# Patient Record
Sex: FEMALE | Race: WHITE | NOT HISPANIC OR LATINO | URBAN - METROPOLITAN AREA
[De-identification: names, ages, dates, MRNs, and addresses within clinical notes are randomized per-mention and may not be internally consistent; named-entity substitution may affect disease eponyms.]

---

## 2021-01-27 ENCOUNTER — WEB ENCOUNTER (OUTPATIENT)
Dept: URBAN - METROPOLITAN AREA CLINIC 113 | Facility: CLINIC | Age: 81
End: 2021-01-27

## 2021-01-27 ENCOUNTER — OFFICE VISIT (OUTPATIENT)
Dept: URBAN - METROPOLITAN AREA CLINIC 113 | Facility: CLINIC | Age: 81
End: 2021-01-27
Payer: MEDICARE

## 2021-01-27 VITALS
SYSTOLIC BLOOD PRESSURE: 155 MMHG | BODY MASS INDEX: 17.48 KG/M2 | HEIGHT: 62 IN | TEMPERATURE: 97.1 F | WEIGHT: 95 LBS | HEART RATE: 80 BPM | DIASTOLIC BLOOD PRESSURE: 59 MMHG

## 2021-01-27 DIAGNOSIS — R63.4 WEIGHT LOSS: ICD-10-CM

## 2021-01-27 DIAGNOSIS — K52.831 COLLAGENOUS COLITIS: ICD-10-CM

## 2021-01-27 DIAGNOSIS — K52.9 CHRONIC DIARRHEA: ICD-10-CM

## 2021-01-27 PROCEDURE — G8482 FLU IMMUNIZE ORDER/ADMIN: HCPCS | Performed by: INTERNAL MEDICINE

## 2021-01-27 PROCEDURE — G8427 DOCREV CUR MEDS BY ELIG CLIN: HCPCS | Performed by: INTERNAL MEDICINE

## 2021-01-27 PROCEDURE — 4004F PT TOBACCO SCREEN RCVD TLK: CPT | Performed by: INTERNAL MEDICINE

## 2021-01-27 PROCEDURE — 99214 OFFICE O/P EST MOD 30 MIN: CPT | Performed by: INTERNAL MEDICINE

## 2021-01-27 RX ORDER — HYOSCYAMINE SULFATE 0.12 MG/1
TAKE 1 TABLET (0.125 MG) BY ORAL ROUTE 4 TIMES PER DAY TABLET ORAL
Qty: 0 | Refills: 0 | Status: ON HOLD | COMMUNITY
Start: 1900-01-01

## 2021-01-27 RX ORDER — DONEPEZIL HYDROCHLORIDE 10 MG/1
1 TABLET AT BEDTIME TABLET, FILM COATED ORAL ONCE A DAY
Status: ACTIVE | COMMUNITY

## 2021-01-27 RX ORDER — B-COMPLEX WITH VITAMIN C
AS DIRECTED TABLET ORAL
Status: ACTIVE | COMMUNITY

## 2021-01-27 RX ORDER — TIOTROPIUM BROMIDE 18 UG/1
CAPSULE ORAL; RESPIRATORY (INHALATION)
Qty: 0 | Refills: 0 | Status: ACTIVE | COMMUNITY
Start: 1900-01-01

## 2021-01-27 RX ORDER — ASPIRIN 81 MG/1
1 TABLET TABLET ORAL ONCE A DAY
Status: ACTIVE | COMMUNITY

## 2021-01-27 RX ORDER — BUDESONIDE 3 MG/1
AS DIRECTED CAPSULE, COATED PELLETS ORAL THREE TIMES A DAY
Status: ACTIVE | COMMUNITY

## 2021-01-27 RX ORDER — MEMANTINE HYDROCHLORIDE 10 MG/1
TAKE 1 TABLET (10 MG) BY ORAL ROUTE 2 TIMES PER DAY TABLET ORAL 2
Qty: 0 | Refills: 0 | Status: ACTIVE | COMMUNITY
Start: 1900-01-01

## 2021-01-27 RX ORDER — GABAPENTIN 300 MG/1
TAKE 2 CAPSULE (300 MG) BY ORAL ROUTE DAILY CAPSULE ORAL
Refills: 0 | Status: ACTIVE | COMMUNITY
Start: 1900-01-01

## 2021-01-27 RX ORDER — LORAZEPAM 0.5 MG/1
TAKE 1 TABLET (0.5 MG) BY ORAL ROUTE AS NEEDED TABLET ORAL 2
Qty: 0 | Refills: 0 | Status: ON HOLD | COMMUNITY
Start: 1900-01-01

## 2021-01-27 RX ORDER — ESCITALOPRAM OXALATE 20 MG/1
1 TABLET TABLET, FILM COATED ORAL ONCE A DAY
Status: ACTIVE | COMMUNITY

## 2021-01-27 RX ORDER — TRAZODONE HYDROCHLORIDE 100 MG/1
1 TABLET AT BEDTIME TABLET, FILM COATED ORAL ONCE A DAY
Status: ACTIVE | COMMUNITY

## 2021-01-27 NOTE — HPI-TODAY'S VISIT:
Patient presents today for initial evaluation.  She has a history of collagenous colitis diagnosed by her doctors in [place] in the past.  Her she been doing well until the last week or so when she developed increasing frequency of stool.  She reports 8 or more bowel movements per day.  Watery.  No blood.  Little abdominal cramping.  Questionable nighttime defecation.  No obvious precipitant although she had been on amoxicillin recently for some dental work.  No associated chest pain.  She has chronic lung issues.  She was seen by her primary care physician who did lab work.  These are reviewed.  She had a normal CBC, CRP, sed rate, CMP, TSH.  Her last colonoscopy was reviewed from 2016 which showed a small polyp.  She did have collagenous colitis based on those office notes which are reviewed.  Patient had stool studies although those are not available at this time.

## 2021-02-23 ENCOUNTER — TELEPHONE ENCOUNTER (OUTPATIENT)
Dept: URBAN - METROPOLITAN AREA CLINIC 113 | Facility: CLINIC | Age: 81
End: 2021-02-23

## 2021-02-25 ENCOUNTER — OFFICE VISIT (OUTPATIENT)
Dept: URBAN - METROPOLITAN AREA CLINIC 113 | Facility: CLINIC | Age: 81
End: 2021-02-25
Payer: MEDICARE

## 2021-02-25 VITALS
BODY MASS INDEX: 17.66 KG/M2 | DIASTOLIC BLOOD PRESSURE: 68 MMHG | HEART RATE: 78 BPM | WEIGHT: 96 LBS | HEIGHT: 62 IN | SYSTOLIC BLOOD PRESSURE: 148 MMHG | TEMPERATURE: 97.8 F

## 2021-02-25 DIAGNOSIS — K52.9 CHRONIC DIARRHEA: ICD-10-CM

## 2021-02-25 DIAGNOSIS — K52.831 COLLAGENOUS COLITIS: ICD-10-CM

## 2021-02-25 PROCEDURE — 99213 OFFICE O/P EST LOW 20 MIN: CPT | Performed by: NURSE PRACTITIONER

## 2021-02-25 RX ORDER — TIOTROPIUM BROMIDE 18 UG/1
CAPSULE ORAL; RESPIRATORY (INHALATION)
Qty: 0 | Refills: 0 | Status: ACTIVE | COMMUNITY
Start: 1900-01-01

## 2021-02-25 RX ORDER — BUDESONIDE 3 MG/1
AS DIRECTED CAPSULE, COATED PELLETS ORAL THREE TIMES A DAY
Status: ACTIVE | COMMUNITY

## 2021-02-25 RX ORDER — BUDESONIDE 3 MG/1
AS DIRECTED CAPSULE, COATED PELLETS ORAL THREE TIMES A DAY
OUTPATIENT

## 2021-02-25 RX ORDER — B-COMPLEX WITH VITAMIN C
AS DIRECTED TABLET ORAL
Status: ACTIVE | COMMUNITY

## 2021-02-25 RX ORDER — LORAZEPAM 0.5 MG/1
TAKE 1 TABLET (0.5 MG) BY ORAL ROUTE AS NEEDED TABLET ORAL 2
Qty: 0 | Refills: 0 | Status: ON HOLD | COMMUNITY
Start: 1900-01-01

## 2021-02-25 RX ORDER — DONEPEZIL HYDROCHLORIDE 10 MG/1
1 TABLET AT BEDTIME TABLET, FILM COATED ORAL ONCE A DAY
Status: ACTIVE | COMMUNITY

## 2021-02-25 RX ORDER — ESCITALOPRAM OXALATE 20 MG/1
1 TABLET TABLET, FILM COATED ORAL ONCE A DAY
Status: ACTIVE | COMMUNITY

## 2021-02-25 RX ORDER — GABAPENTIN 300 MG/1
3 CAPSULE CAPSULE ORAL ONCE A DAY
Refills: 0 | Status: ACTIVE | COMMUNITY
Start: 1900-01-01

## 2021-02-25 RX ORDER — MEMANTINE HYDROCHLORIDE 10 MG/1
TAKE 1 TABLET (10 MG) BY ORAL ROUTE 2 TIMES PER DAY TABLET ORAL 2
Qty: 0 | Refills: 0 | Status: ACTIVE | COMMUNITY
Start: 1900-01-01

## 2021-02-25 RX ORDER — ASPIRIN 81 MG/1
1 TABLET TABLET ORAL ONCE A DAY
Status: ACTIVE | COMMUNITY

## 2021-02-25 RX ORDER — TRAZODONE HYDROCHLORIDE 100 MG/1
1 TABLET AT BEDTIME TABLET, FILM COATED ORAL ONCE A DAY
Status: ACTIVE | COMMUNITY

## 2021-02-25 RX ORDER — HYOSCYAMINE SULFATE 0.12 MG/1
TAKE 1 TABLET (0.125 MG) BY ORAL ROUTE 4 TIMES PER DAY TABLET ORAL
Qty: 0 | Refills: 0 | Status: ON HOLD | COMMUNITY
Start: 1900-01-01

## 2021-02-25 NOTE — HPI-OTHER HISTORIES
80-year-old woman presenting for follow-up regarding diarrhea.  She was last seen in the office on 1/27/21.  She reported a history of collagenous colitis diagnosed by her doctors in [place] in the past.  At that time, she had been doing well until the last week or so when she developed increasing frequency of stool characterized by 8 or more bowel movements per day.  Stools were watery but nonbloody.  There was questionable nighttime defecation.  This was felt to be a flare of her collagenous colitis.  She noted improvement in stool frequency with budesonide 9 mg daily.  She was instructed to begin tapering was.  She was encouraged she is Imodium 3 times daily as needed. She called the office on 2/23/21 reporting recurrent increasing frequency following budesonide taper.  She completely tapered off of the budesonide, and experienced recurrence of diarrhea. She spends more than half the day in the restroom with diarrhea. She has had at least 4 watery stools this morning; it is 11:41 am. She typically has 5 or more stools per day. She has had incontinence a couple of times. She has not been supplementing with Ensure in attempt to avoid dairy products. No blood per rectum. No fever or chills. She resumed budesonide 3mg, 3 capsules daily approximately 2 days ago.

## 2021-03-01 ENCOUNTER — TELEPHONE ENCOUNTER (OUTPATIENT)
Dept: URBAN - METROPOLITAN AREA CLINIC 113 | Facility: CLINIC | Age: 81
End: 2021-03-01

## 2021-03-01 RX ORDER — DICYCLOMINE HYDROCHLORIDE 10 MG/1
1 TABLET CAPSULE ORAL ONCE A DAY
Qty: 30 | Refills: 0 | OUTPATIENT
Start: 2021-03-01 | End: 2021-03-31

## 2021-03-03 ENCOUNTER — TELEPHONE ENCOUNTER (OUTPATIENT)
Dept: URBAN - METROPOLITAN AREA CLINIC 113 | Facility: CLINIC | Age: 81
End: 2021-03-03

## 2021-03-03 RX ORDER — DICYCLOMINE HYDROCHLORIDE 10 MG/1
1 TABLET CAPSULE ORAL ONCE A DAY
Qty: 30 | Refills: 0
Start: 2021-03-01

## 2021-03-05 ENCOUNTER — TELEPHONE ENCOUNTER (OUTPATIENT)
Dept: URBAN - METROPOLITAN AREA CLINIC 113 | Facility: CLINIC | Age: 81
End: 2021-03-05

## 2021-03-05 RX ORDER — DIPHENOXYLATE HYDROCHLORIDE AND ATROPINE SULFATE 2.5; .025 MG/1; MG/1
1 TABLET AS NEEDED TABLET ORAL
Qty: 120 TABLET | Refills: 0 | OUTPATIENT
Start: 2021-03-05 | End: 2021-04-04

## 2021-03-23 ENCOUNTER — OFFICE VISIT (OUTPATIENT)
Dept: URBAN - METROPOLITAN AREA CLINIC 107 | Facility: CLINIC | Age: 81
End: 2021-03-23
Payer: MEDICARE

## 2021-03-23 ENCOUNTER — WEB ENCOUNTER (OUTPATIENT)
Dept: URBAN - METROPOLITAN AREA CLINIC 107 | Facility: CLINIC | Age: 81
End: 2021-03-23

## 2021-03-23 VITALS
DIASTOLIC BLOOD PRESSURE: 59 MMHG | HEIGHT: 62 IN | BODY MASS INDEX: 17.08 KG/M2 | TEMPERATURE: 98.2 F | WEIGHT: 92.8 LBS | SYSTOLIC BLOOD PRESSURE: 117 MMHG | HEART RATE: 70 BPM

## 2021-03-23 DIAGNOSIS — K52.831 COLLAGENOUS COLITIS: ICD-10-CM

## 2021-03-23 DIAGNOSIS — K52.9 CHRONIC DIARRHEA: ICD-10-CM

## 2021-03-23 DIAGNOSIS — R93.5 ABNORMAL ABDOMINAL CT SCAN: ICD-10-CM

## 2021-03-23 DIAGNOSIS — R63.4 WEIGHT LOSS: ICD-10-CM

## 2021-03-23 DIAGNOSIS — R10.9 STOMACH PAIN: ICD-10-CM

## 2021-03-23 DIAGNOSIS — E86.0 DEHYDRATION: ICD-10-CM

## 2021-03-23 PROCEDURE — 99213 OFFICE O/P EST LOW 20 MIN: CPT | Performed by: NURSE PRACTITIONER

## 2021-03-23 RX ORDER — DIPHENOXYLATE HYDROCHLORIDE AND ATROPINE SULFATE 2.5; .025 MG/1; MG/1
1 TABLET AS NEEDED TABLET ORAL
Qty: 120 TABLET | Refills: 0 | Status: ACTIVE | COMMUNITY
Start: 2021-03-05 | End: 2021-04-04

## 2021-03-23 RX ORDER — ASPIRIN 81 MG/1
1 TABLET TABLET ORAL ONCE A DAY
Status: ON HOLD | COMMUNITY

## 2021-03-23 RX ORDER — TIOTROPIUM BROMIDE 18 UG/1
CAPSULE ORAL; RESPIRATORY (INHALATION)
Qty: 0 | Refills: 0 | Status: ACTIVE | COMMUNITY
Start: 1900-01-01

## 2021-03-23 RX ORDER — DONEPEZIL HYDROCHLORIDE 10 MG/1
1 TABLET AT BEDTIME TABLET, FILM COATED ORAL ONCE A DAY
Status: ACTIVE | COMMUNITY

## 2021-03-23 RX ORDER — TRAZODONE HYDROCHLORIDE 100 MG/1
1 TABLET AT BEDTIME TABLET, FILM COATED ORAL ONCE A DAY
Status: ACTIVE | COMMUNITY

## 2021-03-23 RX ORDER — HYOSCYAMINE SULFATE 0.12 MG/1
TAKE 1 TABLET (0.125 MG) BY ORAL ROUTE 4 TIMES PER DAY TABLET ORAL
Qty: 0 | Refills: 0 | Status: ON HOLD | COMMUNITY
Start: 1900-01-01

## 2021-03-23 RX ORDER — LORAZEPAM 0.5 MG/1
TAKE 1 TABLET (0.5 MG) BY ORAL ROUTE AS NEEDED TABLET ORAL 2
Qty: 0 | Refills: 0 | Status: ON HOLD | COMMUNITY
Start: 1900-01-01

## 2021-03-23 RX ORDER — ESCITALOPRAM OXALATE 20 MG/1
1 TABLET TABLET, FILM COATED ORAL ONCE A DAY
Status: ACTIVE | COMMUNITY

## 2021-03-23 RX ORDER — B-COMPLEX WITH VITAMIN C
AS DIRECTED TABLET ORAL
Status: ACTIVE | COMMUNITY

## 2021-03-23 RX ORDER — DENOSUMAB 60 MG/ML
AS DIRECTED INJECTION SUBCUTANEOUS
Status: ACTIVE | COMMUNITY

## 2021-03-23 RX ORDER — MEMANTINE HYDROCHLORIDE 10 MG/1
TAKE 1 TABLET (10 MG) BY ORAL ROUTE 2 TIMES PER DAY TABLET ORAL 2
Qty: 0 | Refills: 0 | Status: ACTIVE | COMMUNITY
Start: 1900-01-01

## 2021-03-23 RX ORDER — BUDESONIDE 3 MG/1
AS DIRECTED CAPSULE, COATED PELLETS ORAL THREE TIMES A DAY
OUTPATIENT

## 2021-03-23 RX ORDER — DIPHENOXYLATE HYDROCHLORIDE AND ATROPINE SULFATE 2.5; .025 MG/1; MG/1
1 TABLET AS NEEDED TABLET ORAL
Status: ACTIVE | COMMUNITY

## 2021-03-23 RX ORDER — BUDESONIDE 3 MG/1
AS DIRECTED CAPSULE, COATED PELLETS ORAL THREE TIMES A DAY
Status: ACTIVE | COMMUNITY

## 2021-03-23 RX ORDER — DICYCLOMINE HYDROCHLORIDE 10 MG/1
1 TABLET CAPSULE ORAL ONCE A DAY
Qty: 30 | Refills: 0 | Status: ACTIVE | COMMUNITY
Start: 2021-03-01

## 2021-03-23 RX ORDER — DIPHENOXYLATE HYDROCHLORIDE AND ATROPINE SULFATE 2.5; .025 MG/1; MG/1
1 TABLET AS NEEDED TABLET ORAL
OUTPATIENT

## 2021-03-23 RX ORDER — GABAPENTIN 300 MG/1
3 CAPSULE CAPSULE ORAL ONCE A DAY
Refills: 0 | Status: ACTIVE | COMMUNITY
Start: 1900-01-01

## 2021-03-23 NOTE — HPI-TODAY'S VISIT:
80-year-old woman presenting for follow-up regarding chronic diarrhea, likely collagenous colitis associated with abdominal cramping.  She was last seen in the office on 2/25/21 with complaints of chronic diarrhea likely collagenous colitis, initially improved with a short course (less than 1 week) of budesonide 9 mg daily, recurring following taper.  She was recommended to resume budesonide 9 mg  daily for 4 weeks, tapering to 6 mg for 1 week then 3 mg for one week.  She was recommended to continue  dairy avoidance, as well as a diet low in fermentable sugars.  She reports persistent diarrhea characterized by 4 stools in the morning associated with significant lower abdominal cramping. It was very confusing as to what medication regimen she has been following for the last 4 weeks as she presented today with her son-in-law, and last visit with her daughter. Initially, Mark (son-in-law) stated that she was taking the medication, thought to be budesonide, 4 times daily which was then decreased to once daily because of constipation. It turned out that she was taking Imodium or Lomotil four times daily with resulting constipation. It appears that she has been on budesonide 3 times daily (9mg) for the last 4 weeks. She is currently taking dicyclomine as needed, and taking Lomotil up to 4 times per day. Her weight is stable. However, Mark raises concern about her dietary intake, stating she will often skip meals. Her diet does seem to continue to include cheese products and broccoli.

## 2021-04-05 ENCOUNTER — TELEPHONE ENCOUNTER (OUTPATIENT)
Dept: URBAN - METROPOLITAN AREA CLINIC 113 | Facility: CLINIC | Age: 81
End: 2021-04-05

## 2021-04-05 RX ORDER — BUDESONIDE 3 MG/1
AS DIRECTED CAPSULE, COATED PELLETS ORAL THREE TIMES A DAY
Qty: 90 | Refills: 1

## 2021-04-08 ENCOUNTER — OFFICE VISIT (OUTPATIENT)
Dept: URBAN - METROPOLITAN AREA CLINIC 113 | Facility: CLINIC | Age: 81
End: 2021-04-08

## 2021-04-13 ENCOUNTER — TELEPHONE ENCOUNTER (OUTPATIENT)
Dept: URBAN - METROPOLITAN AREA CLINIC 113 | Facility: CLINIC | Age: 81
End: 2021-04-13

## 2021-04-13 RX ORDER — DIPHENOXYLATE HYDROCHLORIDE AND ATROPINE SULFATE 2.5; .025 MG/1; MG/1: 1 TABLET AS NEEDED TABLET ORAL

## 2021-04-22 ENCOUNTER — OFFICE VISIT (OUTPATIENT)
Dept: URBAN - METROPOLITAN AREA CLINIC 113 | Facility: CLINIC | Age: 81
End: 2021-04-22
Payer: MEDICARE

## 2021-04-22 ENCOUNTER — LAB OUTSIDE AN ENCOUNTER (OUTPATIENT)
Dept: URBAN - METROPOLITAN AREA CLINIC 113 | Facility: CLINIC | Age: 81
End: 2021-04-22

## 2021-04-22 DIAGNOSIS — R63.4 WEIGHT LOSS: ICD-10-CM

## 2021-04-22 DIAGNOSIS — R19.7 DIARRHEA, UNSPECIFIED TYPE: ICD-10-CM

## 2021-04-22 DIAGNOSIS — K52.831 COLLAGENOUS COLITIS: ICD-10-CM

## 2021-04-22 PROBLEM — 62315008: Status: ACTIVE | Noted: 2021-04-22

## 2021-04-22 PROCEDURE — 99214 OFFICE O/P EST MOD 30 MIN: CPT | Performed by: INTERNAL MEDICINE

## 2021-04-22 RX ORDER — DONEPEZIL HYDROCHLORIDE 10 MG/1
1 TABLET AT BEDTIME TABLET, FILM COATED ORAL ONCE A DAY
Status: ACTIVE | COMMUNITY

## 2021-04-22 RX ORDER — B-COMPLEX WITH VITAMIN C
AS DIRECTED TABLET ORAL
Status: ACTIVE | COMMUNITY

## 2021-04-22 RX ORDER — DUREZOL 0.5 MG/ML
1 DROP INTO AFFECTED EYE EMULSION OPHTHALMIC TWICE A DAY
Status: ACTIVE | COMMUNITY

## 2021-04-22 RX ORDER — DENOSUMAB 60 MG/ML
AS DIRECTED INJECTION SUBCUTANEOUS
Status: ACTIVE | COMMUNITY

## 2021-04-22 RX ORDER — CHOLESTYRAMINE 4 G/9G
1 PACKET MIXED WITH WATER OR NON-CARBONATED DRINK POWDER, FOR SUSPENSION ORAL
Qty: 60 | Refills: 5 | OUTPATIENT
Start: 2021-04-22

## 2021-04-22 RX ORDER — DIPHENOXYLATE HYDROCHLORIDE AND ATROPINE SULFATE 2.5; .025 MG/1; MG/1
1 TABLET AS NEEDED TABLET ORAL
Status: ACTIVE | COMMUNITY

## 2021-04-22 RX ORDER — BUDESONIDE 3 MG/1
AS DIRECTED CAPSULE, COATED PELLETS ORAL THREE TIMES A DAY
Qty: 90 | Refills: 1 | Status: ON HOLD | COMMUNITY

## 2021-04-22 RX ORDER — GABAPENTIN 300 MG/1
3 CAPSULE CAPSULE ORAL ONCE A DAY
Refills: 0 | Status: ACTIVE | COMMUNITY
Start: 1900-01-01

## 2021-04-22 RX ORDER — MIRTAZAPINE 15 MG/1
1 TABLET AT BEDTIME TABLET, FILM COATED ORAL ONCE A DAY
Status: ACTIVE | COMMUNITY

## 2021-04-22 RX ORDER — ASPIRIN 81 MG/1
1 TABLET TABLET ORAL ONCE A DAY
Status: DISCONTINUED | COMMUNITY

## 2021-04-22 RX ORDER — TRAZODONE HYDROCHLORIDE 100 MG/1
1 TABLET AT BEDTIME TABLET, FILM COATED ORAL ONCE A DAY
Status: DISCONTINUED | COMMUNITY

## 2021-04-22 RX ORDER — LORAZEPAM 0.5 MG/1
TAKE 1 TABLET (0.5 MG) BY ORAL ROUTE AS NEEDED TABLET ORAL 2
Qty: 0 | Refills: 0 | Status: DISCONTINUED | COMMUNITY
Start: 1900-01-01

## 2021-04-22 RX ORDER — TIOTROPIUM BROMIDE 18 UG/1
CAPSULE ORAL; RESPIRATORY (INHALATION)
Qty: 0 | Refills: 0 | Status: ACTIVE | COMMUNITY
Start: 1900-01-01

## 2021-04-22 RX ORDER — DICYCLOMINE HYDROCHLORIDE 10 MG/1
1 TABLET CAPSULE ORAL ONCE A DAY
Qty: 30 | Refills: 0 | Status: ACTIVE | COMMUNITY
Start: 2021-03-01

## 2021-04-22 RX ORDER — MEMANTINE HYDROCHLORIDE 10 MG/1
TAKE 1 TABLET (10 MG) BY ORAL ROUTE 2 TIMES PER DAY TABLET ORAL 2
Qty: 0 | Refills: 0 | Status: ACTIVE | COMMUNITY
Start: 1900-01-01

## 2021-04-22 RX ORDER — PREDNISOLN SP/MOXIFLOX/BROMFEN 1 %-0.5 %
AS DIRECTED DROPS OPHTHALMIC (EYE)
Status: ACTIVE | COMMUNITY

## 2021-04-22 RX ORDER — ESCITALOPRAM OXALATE 20 MG/1
1 TABLET TABLET, FILM COATED ORAL ONCE A DAY
Status: ACTIVE | COMMUNITY

## 2021-04-22 RX ORDER — HYOSCYAMINE SULFATE 0.12 MG/1
TAKE 1 TABLET (0.125 MG) BY ORAL ROUTE 4 TIMES PER DAY TABLET ORAL
Qty: 0 | Refills: 0 | Status: DISCONTINUED | COMMUNITY
Start: 1900-01-01

## 2021-04-22 NOTE — HPI-TODAY'S VISIT:
Patient returns today in follow-up accompanied by her daughter.  She is having 2 bowel movements per day which are loose.  It continues to sound like there is a lot of confusion regarding what her bowel pattern truly is.  She has some times where she is not going as frequently.  Loose stool continues to be the primary complaint.  She is off Entocort.  She is taking Lomotil up to 4 times a day.  She was taking fiber previously.  She is not having blood in the stool.  There is no abdominal pain.  Daughter remains concerned about weight loss.  Previously there was some discussion regarding her not eating meals with some component of dementia involved.  They tell me that she is eating meals although the daughter is not present for each meal.  Patient specifically denies any swallowing difficulty or pain with eating.  She denies anorexia

## 2021-05-14 ENCOUNTER — WEB ENCOUNTER (OUTPATIENT)
Dept: URBAN - METROPOLITAN AREA CLINIC 113 | Facility: CLINIC | Age: 81
End: 2021-05-14

## 2021-05-28 ENCOUNTER — OFFICE VISIT (OUTPATIENT)
Dept: URBAN - METROPOLITAN AREA CLINIC 113 | Facility: CLINIC | Age: 81
End: 2021-05-28
Payer: MEDICARE

## 2021-05-28 VITALS
DIASTOLIC BLOOD PRESSURE: 62 MMHG | HEIGHT: 62 IN | TEMPERATURE: 97.6 F | SYSTOLIC BLOOD PRESSURE: 124 MMHG | WEIGHT: 95 LBS | HEART RATE: 76 BPM | BODY MASS INDEX: 17.48 KG/M2 | RESPIRATION RATE: 18 BRPM

## 2021-05-28 DIAGNOSIS — R63.4 WEIGHT LOSS: ICD-10-CM

## 2021-05-28 DIAGNOSIS — K52.831 COLLAGENOUS COLITIS: ICD-10-CM

## 2021-05-28 DIAGNOSIS — K92.1 BLACK STOOL: ICD-10-CM

## 2021-05-28 PROBLEM — 19311003 COLLAGENOUS COLITIS: Status: ACTIVE | Noted: 2021-01-27

## 2021-05-28 PROCEDURE — 99214 OFFICE O/P EST MOD 30 MIN: CPT | Performed by: INTERNAL MEDICINE

## 2021-05-28 RX ORDER — PREDNISOLN SP/MOXIFLOX/BROMFEN 1 %-0.5 %
AS DIRECTED DROPS OPHTHALMIC (EYE)
Status: DISCONTINUED | COMMUNITY

## 2021-05-28 RX ORDER — DONEPEZIL HYDROCHLORIDE 10 MG/1
1 TABLET AT BEDTIME TABLET, FILM COATED ORAL ONCE A DAY
Status: ACTIVE | COMMUNITY

## 2021-05-28 RX ORDER — DICYCLOMINE HYDROCHLORIDE 10 MG/1
1 TABLET CAPSULE ORAL ONCE A DAY
Qty: 30 | Refills: 0 | Status: ACTIVE | COMMUNITY
Start: 2021-03-01

## 2021-05-28 RX ORDER — DONEPEZIL HYDROCHLORIDE 10 MG/1
1 TABLET AT BEDTIME TABLET, FILM COATED ORAL ONCE A DAY
Status: DISCONTINUED | COMMUNITY

## 2021-05-28 RX ORDER — BUDESONIDE 3 MG/1
3 CAPSULES DAILY X  2 WEEKS, THEN 2 CAPSULES DAILY X 2 WEEKS THEN 1 CAPSULE DAILY  FOR 2 WEEKS CAPSULE ORAL ONCE A DAY
Qty: 84 | Refills: 0 | OUTPATIENT
Start: 2021-05-28 | End: 2021-07-27

## 2021-05-28 RX ORDER — DENOSUMAB 60 MG/ML
AS DIRECTED INJECTION SUBCUTANEOUS
Status: ACTIVE | COMMUNITY

## 2021-05-28 RX ORDER — DICYCLOMINE HYDROCHLORIDE 10 MG/1
1 TABLET CAPSULE ORAL ONCE A DAY
Qty: 30 | Refills: 0
Start: 2021-03-01 | End: 2021-06-27

## 2021-05-28 RX ORDER — MEMANTINE HYDROCHLORIDE 10 MG/1
TAKE 1 TABLET (10 MG) BY ORAL ROUTE 2 TIMES PER DAY TABLET ORAL 2
Qty: 0 | Refills: 0 | Status: ACTIVE | COMMUNITY
Start: 1900-01-01

## 2021-05-28 RX ORDER — GABAPENTIN 300 MG/1
1 TABLET CAPSULE ORAL 4 TIMES DAILY
Refills: 0 | Status: ACTIVE | COMMUNITY
Start: 1900-01-01

## 2021-05-28 RX ORDER — DUREZOL 0.5 MG/ML
1 DROP INTO AFFECTED EYE EMULSION OPHTHALMIC TWICE A DAY
Status: DISCONTINUED | COMMUNITY

## 2021-05-28 RX ORDER — MIRTAZAPINE 15 MG/1
1 TABLET AT BEDTIME TABLET, FILM COATED ORAL ONCE A DAY
Status: ACTIVE | COMMUNITY

## 2021-05-28 RX ORDER — DIPHENOXYLATE HYDROCHLORIDE AND ATROPINE SULFATE 2.5; .025 MG/1; MG/1
1 TABLET AS NEEDED TABLET ORAL
Status: ACTIVE | COMMUNITY

## 2021-05-28 RX ORDER — ESCITALOPRAM OXALATE 20 MG/1
1 TABLET TABLET, FILM COATED ORAL ONCE A DAY
Status: ACTIVE | COMMUNITY

## 2021-05-28 RX ORDER — CHOLESTYRAMINE 4 G/9G
1 PACKET MIXED WITH WATER OR NON-CARBONATED DRINK POWDER, FOR SUSPENSION ORAL
Qty: 60 | Refills: 5 | Status: DISCONTINUED | COMMUNITY
Start: 2021-04-22

## 2021-05-28 RX ORDER — B-COMPLEX WITH VITAMIN C
AS DIRECTED TABLET ORAL
Status: ACTIVE | COMMUNITY

## 2021-05-28 RX ORDER — BUDESONIDE 3 MG/1
AS DIRECTED CAPSULE, COATED PELLETS ORAL THREE TIMES A DAY
Qty: 90 | Refills: 1 | Status: DISCONTINUED | COMMUNITY

## 2021-05-28 RX ORDER — TIOTROPIUM BROMIDE 18 UG/1
1 CAPSULE BY INHALING THE CONTENTS OF THE CAPSULE USING THE HANDIHALER DEVICE CAPSULE ORAL; RESPIRATORY (INHALATION) ONCE A DAY
Refills: 0 | Status: ACTIVE | COMMUNITY
Start: 1900-01-01

## 2021-05-28 NOTE — HPI-TODAY'S VISIT:
Patient presents today in follow-up.  She continues to have multiple GI complaints.  She underwent CT scan.  She is accompanied by her daughter.  She reports that she has approximately 5 bowel movements per day.  Watery.  She has incontinence.  She does not know she needs to go to the bathroom.  Bowel movements are worse when she eats.  She does have some abdominal cramping which is improved with dicyclomine.  There is no blood in the stool.  She has been eating.  She brings with her dietary diary but does not show any obvious precipitant.  She has a history of collagenous colitis. She has had some black stool recently.  She denies taking any NSAIDs. CT scan shows Atherosclerosis as well as mild PD dilatation.  No obstructive mass.  No inflammation of the colon or small bowel noted.

## 2021-05-29 LAB
BASO (ABSOLUTE): 0.1
BASOS: 1
EOS (ABSOLUTE): 0.2
EOS: 3
FERRITIN, SERUM: 74
HEMATOCRIT: 41.4
HEMATOLOGY COMMENTS:: (no result)
HEMOGLOBIN: 13.1
IMMATURE CELLS: (no result)
IMMATURE GRANS (ABS): 0
IMMATURE GRANULOCYTES: 0
LYMPHS (ABSOLUTE): 1.4
LYMPHS: 23
MCH: 32.1
MCHC: 31.6
MCV: 102
MONOCYTES(ABSOLUTE): 0.6
MONOCYTES: 9
NEUTROPHILS (ABSOLUTE): 3.9
NEUTROPHILS: 64
NRBC: (no result)
PLATELETS: 234
RBC: 4.08
RDW: 13.4
WBC: 6.1

## 2021-06-07 ENCOUNTER — WEB ENCOUNTER (OUTPATIENT)
Dept: URBAN - METROPOLITAN AREA CLINIC 113 | Facility: CLINIC | Age: 81
End: 2021-06-07

## 2021-06-07 RX ORDER — ESCITALOPRAM OXALATE 20 MG/1
1 TABLET TABLET, FILM COATED ORAL ONCE A DAY
Status: ACTIVE | COMMUNITY

## 2021-06-07 RX ORDER — DENOSUMAB 60 MG/ML
AS DIRECTED INJECTION SUBCUTANEOUS
Status: ACTIVE | COMMUNITY

## 2021-06-07 RX ORDER — DIPHENOXYLATE HYDROCHLORIDE AND ATROPINE SULFATE 2.5; .025 MG/1; MG/1
1 TABLET AS NEEDED TABLET ORAL
Status: ACTIVE | COMMUNITY

## 2021-06-07 RX ORDER — MEMANTINE HYDROCHLORIDE 10 MG/1
TAKE 1 TABLET (10 MG) BY ORAL ROUTE 2 TIMES PER DAY TABLET ORAL 2
Qty: 0 | Refills: 0 | Status: ACTIVE | COMMUNITY
Start: 1900-01-01

## 2021-06-07 RX ORDER — DONEPEZIL HYDROCHLORIDE 10 MG/1
1 TABLET AT BEDTIME TABLET, FILM COATED ORAL ONCE A DAY
Status: ACTIVE | COMMUNITY

## 2021-06-07 RX ORDER — BUDESONIDE 3 MG/1
3 CAPSULES DAILY X  2 WEEKS, THEN 2 CAPSULES DAILY X 2 WEEKS THEN 1 CAPSULE DAILY  FOR 2 WEEKS CAPSULE ORAL ONCE A DAY
Qty: 84 | Refills: 0 | Status: ACTIVE | COMMUNITY
Start: 2021-05-28 | End: 2021-07-27

## 2021-06-07 RX ORDER — MIRTAZAPINE 15 MG/1
1 TABLET AT BEDTIME TABLET, FILM COATED ORAL ONCE A DAY
Status: ACTIVE | COMMUNITY

## 2021-06-07 RX ORDER — B-COMPLEX WITH VITAMIN C
AS DIRECTED TABLET ORAL
Status: ACTIVE | COMMUNITY

## 2021-06-07 RX ORDER — TIOTROPIUM BROMIDE 18 UG/1
1 CAPSULE BY INHALING THE CONTENTS OF THE CAPSULE USING THE HANDIHALER DEVICE CAPSULE ORAL; RESPIRATORY (INHALATION) ONCE A DAY
Refills: 0 | Status: ACTIVE | COMMUNITY
Start: 1900-01-01

## 2021-06-07 RX ORDER — DICYCLOMINE HYDROCHLORIDE 10 MG/1
1 TABLET CAPSULE ORAL ONCE A DAY
Qty: 30 | Refills: 0 | Status: ACTIVE | COMMUNITY
Start: 2021-03-01 | End: 2021-06-27

## 2021-06-07 RX ORDER — GABAPENTIN 300 MG/1
1 TABLET CAPSULE ORAL 4 TIMES DAILY
Refills: 0 | Status: ACTIVE | COMMUNITY
Start: 1900-01-01

## 2021-06-07 RX ORDER — SODIUM, POTASSIUM,MAG SULFATES 17.5-3.13G
354 ML SOLUTION, RECONSTITUTED, ORAL ORAL ONCE
Qty: 354 MILLILITER | Refills: 0 | OUTPATIENT
Start: 2021-06-08 | End: 2021-06-09

## 2021-06-08 ENCOUNTER — LAB OUTSIDE AN ENCOUNTER (OUTPATIENT)
Dept: URBAN - METROPOLITAN AREA CLINIC 113 | Facility: CLINIC | Age: 81
End: 2021-06-08

## 2021-06-17 ENCOUNTER — OFFICE VISIT (OUTPATIENT)
Dept: URBAN - METROPOLITAN AREA MEDICAL CENTER 2 | Facility: MEDICAL CENTER | Age: 81
End: 2021-06-17

## 2021-06-21 ENCOUNTER — DASHBOARD ENCOUNTERS (OUTPATIENT)
Age: 81
End: 2021-06-21

## 2021-06-21 ENCOUNTER — OFFICE VISIT (OUTPATIENT)
Dept: URBAN - METROPOLITAN AREA CLINIC 113 | Facility: CLINIC | Age: 81
End: 2021-06-21

## 2021-06-21 RX ORDER — MIRTAZAPINE 15 MG/1
1 TABLET AT BEDTIME TABLET, FILM COATED ORAL ONCE A DAY
Status: ACTIVE | COMMUNITY

## 2021-06-21 RX ORDER — ESCITALOPRAM OXALATE 20 MG/1
1 TABLET TABLET, FILM COATED ORAL ONCE A DAY
Status: ACTIVE | COMMUNITY

## 2021-06-21 RX ORDER — MEMANTINE HYDROCHLORIDE 10 MG/1
TAKE 1 TABLET (10 MG) BY ORAL ROUTE 2 TIMES PER DAY TABLET ORAL 2
Qty: 0 | Refills: 0 | Status: ACTIVE | COMMUNITY
Start: 1900-01-01

## 2021-06-21 RX ORDER — B-COMPLEX WITH VITAMIN C
AS DIRECTED TABLET ORAL
Status: ACTIVE | COMMUNITY

## 2021-06-21 RX ORDER — DICYCLOMINE HYDROCHLORIDE 10 MG/1
1 TABLET CAPSULE ORAL ONCE A DAY
Qty: 30 | Refills: 0 | Status: ACTIVE | COMMUNITY
Start: 2021-03-01 | End: 2021-06-27

## 2021-06-21 RX ORDER — BUDESONIDE 3 MG/1
3 CAPSULES DAILY X  2 WEEKS, THEN 2 CAPSULES DAILY X 2 WEEKS THEN 1 CAPSULE DAILY  FOR 2 WEEKS CAPSULE ORAL ONCE A DAY
Qty: 84 | Refills: 0 | Status: ACTIVE | COMMUNITY
Start: 2021-05-28 | End: 2021-07-27

## 2021-06-21 RX ORDER — GABAPENTIN 300 MG/1
1 TABLET CAPSULE ORAL 4 TIMES DAILY
Refills: 0 | Status: ACTIVE | COMMUNITY
Start: 1900-01-01

## 2021-06-21 RX ORDER — TIOTROPIUM BROMIDE 18 UG/1
1 CAPSULE BY INHALING THE CONTENTS OF THE CAPSULE USING THE HANDIHALER DEVICE CAPSULE ORAL; RESPIRATORY (INHALATION) ONCE A DAY
Refills: 0 | Status: ACTIVE | COMMUNITY
Start: 1900-01-01

## 2021-06-21 RX ORDER — DENOSUMAB 60 MG/ML
AS DIRECTED INJECTION SUBCUTANEOUS
Status: ACTIVE | COMMUNITY

## 2021-06-21 RX ORDER — DIPHENOXYLATE HYDROCHLORIDE AND ATROPINE SULFATE 2.5; .025 MG/1; MG/1
1 TABLET AS NEEDED TABLET ORAL
Status: ACTIVE | COMMUNITY

## 2021-06-21 RX ORDER — DONEPEZIL HYDROCHLORIDE 10 MG/1
1 TABLET AT BEDTIME TABLET, FILM COATED ORAL ONCE A DAY
Status: ACTIVE | COMMUNITY

## 2021-06-24 ENCOUNTER — WEB ENCOUNTER (OUTPATIENT)
Dept: URBAN - METROPOLITAN AREA CLINIC 113 | Facility: CLINIC | Age: 81
End: 2021-06-24

## 2021-07-07 ENCOUNTER — TELEPHONE ENCOUNTER (OUTPATIENT)
Dept: URBAN - METROPOLITAN AREA CLINIC 113 | Facility: CLINIC | Age: 81
End: 2021-07-07

## 2021-10-01 ENCOUNTER — TELEPHONE ENCOUNTER (OUTPATIENT)
Dept: URBAN - METROPOLITAN AREA CLINIC 40 | Facility: CLINIC | Age: 81
End: 2021-10-01

## 2021-10-01 RX ORDER — BUDESONIDE 3 MG/1
1 CAPSULES CAPSULE ORAL ONCE A DAY
Qty: 90 | Refills: 3
Start: 2021-05-28 | End: 2022-09-26

## 2021-10-06 ENCOUNTER — TELEPHONE ENCOUNTER (OUTPATIENT)
Dept: URBAN - METROPOLITAN AREA SURGERY CENTER 30 | Facility: SURGERY CENTER | Age: 81
End: 2021-10-06

## 2021-10-06 RX ORDER — BUDESONIDE 3 MG/1
AS DIRECTED CAPSULE, COATED PELLETS ORAL THREE TIMES A DAY
Qty: 90 | Refills: 1